# Patient Record
Sex: FEMALE | Race: WHITE | Employment: OTHER
[De-identification: names, ages, dates, MRNs, and addresses within clinical notes are randomized per-mention and may not be internally consistent; named-entity substitution may affect disease eponyms.]

---

## 2017-02-15 ENCOUNTER — OFFICE VISIT (OUTPATIENT)
Dept: OBGYN | Facility: CLINIC | Age: 82
End: 2017-02-15

## 2017-02-15 VITALS
HEART RATE: 83 BPM | BODY MASS INDEX: 20.72 KG/M2 | DIASTOLIC BLOOD PRESSURE: 59 MMHG | HEIGHT: 67 IN | WEIGHT: 132 LBS | SYSTOLIC BLOOD PRESSURE: 95 MMHG

## 2017-02-15 DIAGNOSIS — Z46.89 PESSARY MAINTENANCE: Primary | ICD-10-CM

## 2017-02-15 PROCEDURE — 99213 OFFICE O/P EST LOW 20 MIN: CPT | Performed by: OBSTETRICS & GYNECOLOGY

## 2017-02-15 ASSESSMENT — PATIENT HEALTH QUESTIONNAIRE - PHQ9
SUM OF ALL RESPONSES TO PHQ QUESTIONS 1-9: 0
2. FEELING DOWN, DEPRESSED OR HOPELESS: 0
SUM OF ALL RESPONSES TO PHQ9 QUESTIONS 1 & 2: 0
1. LITTLE INTEREST OR PLEASURE IN DOING THINGS: 0

## 2017-05-17 ENCOUNTER — OFFICE VISIT (OUTPATIENT)
Dept: OBGYN CLINIC | Age: 82
End: 2017-05-17
Payer: MEDICARE

## 2017-05-17 VITALS — WEIGHT: 135 LBS | BODY MASS INDEX: 21.46 KG/M2 | DIASTOLIC BLOOD PRESSURE: 62 MMHG | SYSTOLIC BLOOD PRESSURE: 112 MMHG

## 2017-05-17 DIAGNOSIS — Z46.89 ENCOUNTER FOR PESSARY MAINTENANCE: Primary | ICD-10-CM

## 2017-05-17 PROCEDURE — 99213 OFFICE O/P EST LOW 20 MIN: CPT | Performed by: OBSTETRICS & GYNECOLOGY

## 2017-08-30 ENCOUNTER — OFFICE VISIT (OUTPATIENT)
Dept: OBGYN CLINIC | Age: 82
End: 2017-08-30
Payer: MEDICARE

## 2017-08-30 VITALS
SYSTOLIC BLOOD PRESSURE: 127 MMHG | DIASTOLIC BLOOD PRESSURE: 59 MMHG | BODY MASS INDEX: 21.19 KG/M2 | HEART RATE: 94 BPM | WEIGHT: 135 LBS | HEIGHT: 67 IN

## 2017-08-30 DIAGNOSIS — Z46.89 PESSARY MAINTENANCE: Primary | ICD-10-CM

## 2017-08-30 PROCEDURE — 99213 OFFICE O/P EST LOW 20 MIN: CPT | Performed by: OBSTETRICS & GYNECOLOGY

## 2017-08-30 ASSESSMENT — ENCOUNTER SYMPTOMS
ABDOMINAL PAIN: 0
BLOOD IN STOOL: 0

## 2017-12-01 ENCOUNTER — OFFICE VISIT (OUTPATIENT)
Dept: OBGYN CLINIC | Age: 82
End: 2017-12-01
Payer: MEDICARE

## 2017-12-01 VITALS
HEIGHT: 67 IN | WEIGHT: 137 LBS | HEART RATE: 87 BPM | DIASTOLIC BLOOD PRESSURE: 65 MMHG | BODY MASS INDEX: 21.5 KG/M2 | SYSTOLIC BLOOD PRESSURE: 132 MMHG

## 2017-12-01 DIAGNOSIS — Z46.89 ENCOUNTER FOR PESSARY MAINTENANCE: Primary | ICD-10-CM

## 2017-12-01 PROCEDURE — 99213 OFFICE O/P EST LOW 20 MIN: CPT | Performed by: OBSTETRICS & GYNECOLOGY

## 2017-12-01 ASSESSMENT — ENCOUNTER SYMPTOMS
SHORTNESS OF BREATH: 0
ABDOMINAL PAIN: 0
COUGH: 0

## 2017-12-01 NOTE — PROGRESS NOTES
[facility] PHYSICIANS  [facility] - [address] [place] [id number]  Dept: [phone]    Jeremy Benítez is a 79 yo female who presents for pessary maintenance. She was last here 3 months ago. She says she is doing well and not having any problems. She denies any vaginal bleeding or abnormal vaginal discharge. She says she still loves her pessary, and doesn't have any urinary incontinence with it in place. Review of Systems   Constitutional: Negative for chills and fever. Respiratory: Negative for cough and shortness of breath. Cardiovascular: Negative for chest pain and palpitations. Gastrointestinal: Negative for abdominal pain. Genitourinary: Negative for dysuria. Neurological: Negative for dizziness and headaches. O:not currently breastfeeding. Gen: alert, no apparent distress  Pelvic:  Vulva shows some atrophy, but otherwise normal appearing. Vagina and vaginal cuff without any defects on palpation. Pessary (ring with incontinence knob) removed easily and cleansed with water and soap. Pessary replaced without difficulty. A/P:  1.  Encounter for pessary maintenance     Speculum exam with next visit  Pt to follow up in 3 months for pessary maintenance    Tony Medina MD  [address]

## 2018-03-02 ENCOUNTER — OFFICE VISIT (OUTPATIENT)
Dept: OBGYN CLINIC | Age: 83
End: 2018-03-02
Payer: MEDICARE

## 2018-03-02 VITALS
BODY MASS INDEX: 21.97 KG/M2 | DIASTOLIC BLOOD PRESSURE: 74 MMHG | WEIGHT: 140 LBS | HEIGHT: 67 IN | SYSTOLIC BLOOD PRESSURE: 144 MMHG | HEART RATE: 75 BPM

## 2018-03-02 DIAGNOSIS — Z46.89 PESSARY MAINTENANCE: Primary | ICD-10-CM

## 2018-03-02 PROCEDURE — 99213 OFFICE O/P EST LOW 20 MIN: CPT | Performed by: OBSTETRICS & GYNECOLOGY

## 2018-03-02 ASSESSMENT — ENCOUNTER SYMPTOMS
ABDOMINAL PAIN: 0
SHORTNESS OF BREATH: 0
COUGH: 0

## 2018-06-06 ENCOUNTER — OFFICE VISIT (OUTPATIENT)
Dept: OBGYN CLINIC | Age: 83
End: 2018-06-06
Payer: MEDICARE

## 2018-06-06 VITALS
HEART RATE: 80 BPM | WEIGHT: 138 LBS | HEIGHT: 67 IN | SYSTOLIC BLOOD PRESSURE: 112 MMHG | DIASTOLIC BLOOD PRESSURE: 70 MMHG | BODY MASS INDEX: 21.66 KG/M2

## 2018-06-06 DIAGNOSIS — Z46.89 PESSARY MAINTENANCE: Primary | ICD-10-CM

## 2018-06-06 PROCEDURE — 99213 OFFICE O/P EST LOW 20 MIN: CPT | Performed by: OBSTETRICS & GYNECOLOGY

## 2018-06-06 RX ORDER — CILOSTAZOL 50 MG/1
50 TABLET ORAL 2 TIMES DAILY
COMMUNITY

## 2018-09-12 ENCOUNTER — OFFICE VISIT (OUTPATIENT)
Dept: OBGYN CLINIC | Age: 83
End: 2018-09-12
Payer: MEDICARE

## 2018-09-12 VITALS
SYSTOLIC BLOOD PRESSURE: 141 MMHG | DIASTOLIC BLOOD PRESSURE: 69 MMHG | HEART RATE: 88 BPM | WEIGHT: 138 LBS | HEIGHT: 66 IN | BODY MASS INDEX: 22.18 KG/M2

## 2018-09-12 DIAGNOSIS — Z46.89 PESSARY MAINTENANCE: Primary | ICD-10-CM

## 2018-09-12 PROCEDURE — 99213 OFFICE O/P EST LOW 20 MIN: CPT | Performed by: OBSTETRICS & GYNECOLOGY

## 2018-09-12 ASSESSMENT — ENCOUNTER SYMPTOMS
ABDOMINAL PAIN: 0
SHORTNESS OF BREATH: 0
COUGH: 0

## 2018-09-12 NOTE — PROGRESS NOTES
[facility] PHYSICIANS  [facility] - [address] [place] [id number]  Dept: [phone]    Keith Washburn is a 81 yo female who presents for pessary maintenance. She was last here 3 months ago. She says she is doing well and not having any problems. She denies any vaginal bleeding or abnormal vaginal discharge. There is not having any signs of infection; The vaginal vault is without any signs of erythema or erosion. There is no vaginal discharge or odor appreciated. Review of Systems   Constitutional: Negative for chills and fever. HENT: Negative for hearing loss. Respiratory: Negative for cough and shortness of breath. Cardiovascular: Negative for chest pain and palpitations. Gastrointestinal: Negative for abdominal pain. Genitourinary: Negative for dysuria and hematuria. Neurological: Negative for dizziness and headaches. Psychiatric/Behavioral: Negative for depression. O:Blood pressure (!) 141/69, pulse 88, height 5' 6\" (1.676 m), weight 138 lb (62.6 kg), not currently breastfeeding. Gen: alert, no apparent distress  Pelvic:  There is not any signs of infection; The vaginal vault is not without any signs of erythema or erosion. There is not any vaginal discharge or odor appreciated. Pessary removed easily and cleansed with water and soap. Pessary replaced without difficulty. T.O.S. Ointment was placed with the pessary to decrease discharge/odor. A/P:   Diagnosis Orders   1.  Pessary maintenance       Pt to follow up in 3 months for pessary cleaning    Abbie Peres MD  [address]

## 2018-12-12 ENCOUNTER — OFFICE VISIT (OUTPATIENT)
Dept: OBGYN CLINIC | Age: 83
End: 2018-12-12
Payer: MEDICARE

## 2018-12-12 VITALS
DIASTOLIC BLOOD PRESSURE: 67 MMHG | HEIGHT: 66 IN | WEIGHT: 139 LBS | HEART RATE: 104 BPM | SYSTOLIC BLOOD PRESSURE: 133 MMHG | BODY MASS INDEX: 22.34 KG/M2

## 2018-12-12 DIAGNOSIS — Z46.89 ENCOUNTER FOR PESSARY MAINTENANCE: Primary | ICD-10-CM

## 2018-12-12 PROCEDURE — 99213 OFFICE O/P EST LOW 20 MIN: CPT | Performed by: OBSTETRICS & GYNECOLOGY

## 2018-12-12 ASSESSMENT — ENCOUNTER SYMPTOMS
COUGH: 0
ABDOMINAL PAIN: 0
SHORTNESS OF BREATH: 0
BACK PAIN: 0

## 2018-12-12 NOTE — PROGRESS NOTES
[facility] PHYSICIANS  [facility] - [place] [address] [place] [id number]  Dept: [phone]    Partictracey Norton is a 79 yo female whopresents for pessary maintenance. She was last here 3 months ago. She says she is doing well and not having any problems. She denies any vaginal bleeding or abnormal vaginal discharge. There are not any signs of infection; The vaginal vault is without any signs of erythema or erosion. There is no vaginal discharge or odor appreciated. Review of Systems   Constitutional: Negative for chills and fever. Respiratory: Negative for cough and shortness of breath. Cardiovascular: Negative for chest pain and palpitations. Gastrointestinal: Negative for abdominal pain. Genitourinary: Negative for pelvic pain and vaginal discharge. Musculoskeletal: Negative for back pain. Neurological: Negative for dizziness and light-headedness. O:Blood pressure 133/67, pulse 104, height 5' 6\" (1.676 m), weight 139 lb (63 kg), not currently breastfeeding. Gen: alert, no apparent distress  Pelvic:  There is not any sign of infection. Vuvlar atrophy noted, but unchanged     Pessary removed easily and cleansed with water and soap. Pessary replaced without difficulty. T.O.S. Ointment was placed with the pessary to decrease discharge/odor. A/P:   Diagnosis Orders   1.  Encounter for pessary maintenance     Pt to follow up in 3 months    Barbara Cintron MD  [address]

## 2019-03-13 ENCOUNTER — OFFICE VISIT (OUTPATIENT)
Dept: OBGYN CLINIC | Age: 84
End: 2019-03-13
Payer: MEDICARE

## 2019-03-13 VITALS
HEIGHT: 66 IN | BODY MASS INDEX: 22.02 KG/M2 | DIASTOLIC BLOOD PRESSURE: 69 MMHG | WEIGHT: 137 LBS | SYSTOLIC BLOOD PRESSURE: 133 MMHG | HEART RATE: 96 BPM

## 2019-03-13 DIAGNOSIS — Z46.89 PESSARY MAINTENANCE: Primary | ICD-10-CM

## 2019-03-13 PROCEDURE — 99213 OFFICE O/P EST LOW 20 MIN: CPT | Performed by: OBSTETRICS & GYNECOLOGY

## 2019-03-13 ASSESSMENT — ENCOUNTER SYMPTOMS
SHORTNESS OF BREATH: 0
COUGH: 0
BACK PAIN: 0
ABDOMINAL PAIN: 0

## 2019-06-12 ENCOUNTER — OFFICE VISIT (OUTPATIENT)
Dept: OBGYN CLINIC | Age: 84
End: 2019-06-12
Payer: MEDICARE

## 2019-06-12 VITALS
BODY MASS INDEX: 21.86 KG/M2 | HEIGHT: 66 IN | WEIGHT: 136 LBS | DIASTOLIC BLOOD PRESSURE: 74 MMHG | HEART RATE: 82 BPM | SYSTOLIC BLOOD PRESSURE: 118 MMHG

## 2019-06-12 DIAGNOSIS — Z46.89 PESSARY MAINTENANCE: Primary | ICD-10-CM

## 2019-06-12 PROCEDURE — 99213 OFFICE O/P EST LOW 20 MIN: CPT | Performed by: OBSTETRICS & GYNECOLOGY

## 2019-06-12 ASSESSMENT — ENCOUNTER SYMPTOMS
ABDOMINAL PAIN: 0
BACK PAIN: 0
COUGH: 0
SHORTNESS OF BREATH: 0

## 2019-06-12 NOTE — PROGRESS NOTES
[facility] PHYSICIANS  [facility] - [place] [address] [place] [id number]  Dept: [phone]    Jacques Ash is a 81 yo female who presents for pessary maintenance. She was last here 3 months ago. She says she is doing well and not having any problems. She denies any vaginal bleeding or abnormal vaginal discharge. She is still happy with her ring pessary. Review of Systems   Constitutional: Negative for chills and fever. HENT: Negative for congestion. Respiratory: Negative for cough and shortness of breath. Cardiovascular: Negative for chest pain and palpitations. Gastrointestinal: Negative for abdominal pain. Genitourinary: Negative for vaginal bleeding and vaginal discharge. Musculoskeletal: Negative for back pain. Neurological: Negative for dizziness and light-headedness. Psychiatric/Behavioral: The patient is not nervous/anxious. O:Height 5' 6\" (1.676 m), not currently breastfeeding. Gen: alert, no apparent distress  Pelvic:  There is not any signs of infection; The vaginal vault is without any signs of erythema or erosion. There is no vaginal discharge or odor appreciated. Pessary removed easily and cleansed with water and soap. Pessary replaced without difficulty. T.O.S. Ointment was placed with the pessary to decreasedischarge/odor. A/P:   Diagnosis Orders   1.  Pessary maintenance       Pt to return in 3 months for pessary check    Shannon Wagner MD  [address]

## 2019-09-13 ENCOUNTER — OFFICE VISIT (OUTPATIENT)
Dept: OBGYN CLINIC | Age: 84
End: 2019-09-13
Payer: MEDICARE

## 2019-09-13 VITALS
SYSTOLIC BLOOD PRESSURE: 130 MMHG | WEIGHT: 135 LBS | DIASTOLIC BLOOD PRESSURE: 68 MMHG | BODY MASS INDEX: 21.69 KG/M2 | HEART RATE: 80 BPM | HEIGHT: 66 IN

## 2019-09-13 DIAGNOSIS — Z46.89 PESSARY MAINTENANCE: Primary | ICD-10-CM

## 2019-09-13 PROCEDURE — 99213 OFFICE O/P EST LOW 20 MIN: CPT | Performed by: OBSTETRICS & GYNECOLOGY

## 2019-09-13 ASSESSMENT — ENCOUNTER SYMPTOMS
ABDOMINAL PAIN: 0
BACK PAIN: 0
SHORTNESS OF BREATH: 0
COUGH: 0

## 2020-01-07 ENCOUNTER — OFFICE VISIT (OUTPATIENT)
Dept: OBGYN CLINIC | Age: 85
End: 2020-01-07
Payer: MEDICARE

## 2020-01-07 VITALS
BODY MASS INDEX: 22.34 KG/M2 | HEIGHT: 66 IN | HEART RATE: 80 BPM | SYSTOLIC BLOOD PRESSURE: 139 MMHG | DIASTOLIC BLOOD PRESSURE: 66 MMHG | WEIGHT: 139 LBS

## 2020-01-07 PROCEDURE — 99213 OFFICE O/P EST LOW 20 MIN: CPT | Performed by: OBSTETRICS & GYNECOLOGY

## 2020-01-07 ASSESSMENT — ENCOUNTER SYMPTOMS
ABDOMINAL PAIN: 0
BACK PAIN: 0
COUGH: 0
SHORTNESS OF BREATH: 0

## 2020-01-07 NOTE — PROGRESS NOTES
[address]  Dept: [phone]  1/7/2020    Chief Complaint   Patient presents with    3 Month Follow-Up     Pessary         Silvia Olivera is a 81 yo female who presents for pessary maintenance. She was last here 3 months ago. She says she is doing well and not having any problems. She denies any vaginal bleeding or abnormal vaginal discharge. She is still happy with her pessary. Review of Systems   Constitutional: Negative for chills and fever. HENT: Negative for congestion. Respiratory: Negative for cough and shortness of breath. Cardiovascular: Negative for chest pain and palpitations. Gastrointestinal: Negative for abdominal pain. Genitourinary: Negative for vaginal discharge. Musculoskeletal: Negative for back pain. Neurological: Negative for dizziness and light-headedness. Psychiatric/Behavioral: The patient is not nervous/anxious. O:Blood pressure 139/66, pulse 80, height 5' 6\" (1.676 m), weight 139 lb (63 kg), not currently breastfeeding. Gen: alert, no apparent distress  Pelvic:  There is not any signs of infection; The vaginal vault is without any signs of erythema or erosion. There is no vaginal discharge or odor appreciated. Pessary removed easily and cleansed with water and soap. Pessary replaced without difficulty. T.O.S. Ointment was placed with the pessary to decreasedischarge/odor. A/P:   Diagnosis Orders   1.  Pessary maintenance       Pt to follow up in 3 months    Juany Reynolds MD  [address]

## 2020-05-22 ENCOUNTER — OFFICE VISIT (OUTPATIENT)
Dept: OBGYN CLINIC | Age: 85
End: 2020-05-22
Payer: MEDICARE

## 2020-05-22 VITALS
BODY MASS INDEX: 22.18 KG/M2 | HEART RATE: 82 BPM | WEIGHT: 138 LBS | SYSTOLIC BLOOD PRESSURE: 128 MMHG | HEIGHT: 66 IN | DIASTOLIC BLOOD PRESSURE: 78 MMHG

## 2020-05-22 PROCEDURE — 99213 OFFICE O/P EST LOW 20 MIN: CPT | Performed by: OBSTETRICS & GYNECOLOGY

## 2020-05-22 ASSESSMENT — ENCOUNTER SYMPTOMS
SHORTNESS OF BREATH: 0
BACK PAIN: 0
ABDOMINAL PAIN: 0
COUGH: 0

## 2020-05-22 NOTE — PROGRESS NOTES
[facility] PHYSICIANS  [facility] - [address]  Dept: [phone]    Chief Complaint   Patient presents with    Other     Pasty Pascual is a 79 yo female who presents for pessary maintenance. She was last here 3 months ago. She says she is doing well and not having any problems. She hasn't been going out much due to COVID-19, but is getting by she says. She denies any vaginal bleeding or abnormal vaginal discharge. She is still happy with her pessary. Review of Systems   Constitutional: Negative for chills and fever. HENT: Negative for congestion. Respiratory: Negative for cough and shortness of breath. Cardiovascular: Negative for chest pain and palpitations. Gastrointestinal: Negative for abdominal pain. Genitourinary: Negative for pelvic pain and vaginal discharge. Musculoskeletal: Negative for back pain. Neurological: Negative for dizziness and light-headedness. Psychiatric/Behavioral: The patient is not nervous/anxious. O:Blood pressure 128/78, pulse 82, height 5' 6\" (1.676 m), weight 138 lb (62.6 kg), not currently breastfeeding. Gen: alert, no apparent distress  Pelvic:  There is not any signs of infection; The vaginal vault is without any signs of erythema or erosion. There is no vaginal discharge or odor appreciated. Pessary removed easily and cleansed with water and soap. Pessary replaced without difficulty. T.O.S. Ointment was placed with the pessary to decreasedischarge/odor. A/P:   Diagnosis Orders   1.  Encounter for pessary maintenance         Pt to follow up in 3 months for pessary maintenance  Ramona Srinivasan MD  [address]

## 2020-08-21 ENCOUNTER — OFFICE VISIT (OUTPATIENT)
Dept: OBGYN CLINIC | Age: 85
End: 2020-08-21
Payer: MEDICARE

## 2020-08-21 VITALS — DIASTOLIC BLOOD PRESSURE: 65 MMHG | HEART RATE: 84 BPM | SYSTOLIC BLOOD PRESSURE: 127 MMHG

## 2020-08-21 PROCEDURE — 99213 OFFICE O/P EST LOW 20 MIN: CPT | Performed by: OBSTETRICS & GYNECOLOGY

## 2020-08-21 ASSESSMENT — ENCOUNTER SYMPTOMS
COUGH: 0
SHORTNESS OF BREATH: 0
BACK PAIN: 0
ABDOMINAL PAIN: 0

## 2020-08-21 NOTE — PROGRESS NOTES
[facility] PHYSICIANS  [facility] - [address]  Dept: [phone]    Chief Complaint   Patient presents with    Follow-up     3 mo follow up pessricarda Campos is a 81 yo female who presents for pessary maintenance. She was last here 3 months ago. She says she is doing well and not having any problems. She denies any vaginal bleeding or abnormal vaginal discharge. She is still happy with her pessary. Review of Systems   Constitutional: Negative for chills and fever. Respiratory: Negative for cough and shortness of breath. Cardiovascular: Negative for chest pain and palpitations. Gastrointestinal: Negative for abdominal pain. Musculoskeletal: Negative for back pain. Neurological: Negative for dizziness and light-headedness. Psychiatric/Behavioral: The patient is not nervous/anxious. O:Blood pressure 127/65, pulse 84, not currently breastfeeding. Gen: alert, no apparent distress  Pelvic:  There is not any signs of infection; The vaginal vault is without any signs of erythema or erosion. There is no vaginal discharge or odor appreciated. Pessary removed easily and cleansed with water and soap. Pessary replaced without difficulty. T.O.S. Ointment was placed with the pessary to decreasedischarge/odor. A/P:   Diagnosis Orders   1.  Pessary maintenance         Pt to follow up in 3 months  Elodia Gonzales MD  [address]

## 2020-11-23 ENCOUNTER — OFFICE VISIT (OUTPATIENT)
Dept: OBGYN CLINIC | Age: 85
End: 2020-11-23
Payer: MEDICARE

## 2020-11-23 VITALS
HEART RATE: 81 BPM | BODY MASS INDEX: 22.19 KG/M2 | HEIGHT: 67 IN | WEIGHT: 141.38 LBS | SYSTOLIC BLOOD PRESSURE: 115 MMHG | DIASTOLIC BLOOD PRESSURE: 70 MMHG

## 2020-11-23 PROCEDURE — 99213 OFFICE O/P EST LOW 20 MIN: CPT | Performed by: OBSTETRICS & GYNECOLOGY

## 2020-11-23 RX ORDER — METOPROLOL SUCCINATE 25 MG/1
TABLET, EXTENDED RELEASE ORAL
COMMUNITY
Start: 2020-10-20

## 2020-11-23 ASSESSMENT — ENCOUNTER SYMPTOMS
BACK PAIN: 1
COUGH: 0
SHORTNESS OF BREATH: 0
ABDOMINAL PAIN: 0

## 2020-11-23 NOTE — PROGRESS NOTES
[facility]YSIC[facility]ANS  [facility] - [address]  Dept: [phone]    Chief Complaint   Patient presents with    Follow-up     Pessary maintenance        Mayra Carrier is a 79 yo female who presents for pessary maintenance. She was last here 3 months ago. She says she is doing well, but is having some cardiac issues making it difficult for her to walk as far as she normally does. She denies any vaginal bleeding or abnormal vaginal discharge. She is still happy with her pessary. Review of Systems   Constitutional: Negative for chills and fever. HENT: Negative for congestion. Respiratory: Negative for cough and shortness of breath. Cardiovascular: Positive for leg swelling. Negative for chest pain and palpitations. Gastrointestinal: Negative for abdominal pain. Genitourinary: Negative for pelvic pain and vaginal discharge. Musculoskeletal: Positive for back pain. Neurological: Negative for dizziness and light-headedness. Psychiatric/Behavioral: The patient is not nervous/anxious. O:Blood pressure 115/70, pulse 81, height 5' 6.5\" (1.689 m), weight 141 lb 6 oz (64.1 kg), not currently breastfeeding. Gen: alert, no apparent distress  Pelvic:  There is not any signs of infection; The vaginal vault is without any signs of erythema or erosion. There is no vaginal discharge or odor appreciated. Pessary removed easily and cleansed with water and soap. Pessary replaced without difficulty. T.O.S. Ointment was placed with the pessary to decreasedischarge/odor. A/P:   Diagnosis Orders   1. Pessary maintenance       Pt to follow up in 6 months, will space out to avoid flu season and increased risk for COVID.    Jessy Mercer MD  [address]

## 2021-06-08 ENCOUNTER — OFFICE VISIT (OUTPATIENT)
Dept: OBGYN CLINIC | Age: 86
End: 2021-06-08
Payer: MEDICARE

## 2021-06-08 VITALS
HEART RATE: 72 BPM | DIASTOLIC BLOOD PRESSURE: 55 MMHG | SYSTOLIC BLOOD PRESSURE: 102 MMHG | WEIGHT: 123 LBS | BODY MASS INDEX: 19.56 KG/M2

## 2021-06-08 DIAGNOSIS — Z46.89 ENCOUNTER FOR PESSARY MAINTENANCE: Primary | ICD-10-CM

## 2021-06-08 PROCEDURE — 99213 OFFICE O/P EST LOW 20 MIN: CPT | Performed by: OBSTETRICS & GYNECOLOGY

## 2021-06-08 RX ORDER — POTASSIUM CHLORIDE 20 MEQ/1
20 TABLET, EXTENDED RELEASE ORAL DAILY
COMMUNITY
Start: 2021-04-27

## 2021-06-08 RX ORDER — FUROSEMIDE 40 MG/1
40 TABLET ORAL DAILY
COMMUNITY
Start: 2021-04-27

## 2021-06-08 ASSESSMENT — ENCOUNTER SYMPTOMS
SHORTNESS OF BREATH: 0
BACK PAIN: 0
COUGH: 0
ABDOMINAL PAIN: 0

## 2021-06-08 NOTE — PROGRESS NOTES
[facility] PHYSICIANS  [facility] - [address] [facility] [address] [facility]  Dept: [phone]    Chief Complaint   Patient presents with    Follow-up     Pessary maintenance        Sergey Stanton is a 79 yo female who presents for pessary maintenance. She was last here 3 months ago. She says she is doing well and not having any problems. She denies any vaginal bleeding or abnormal vaginal discharge. She is still happy with her pessary. Review of Systems   Constitutional: Negative for chills and fever. HENT: Negative for congestion. Respiratory: Negative for cough and shortness of breath. Cardiovascular: Negative for chest pain and palpitations. Gastrointestinal: Negative for abdominal pain. Genitourinary: Negative for pelvic pain and vaginal discharge. Musculoskeletal: Negative for back pain. Neurological: Negative for dizziness and light-headedness. Psychiatric/Behavioral: The patient is not nervous/anxious. O:Blood pressure (!) 102/55, pulse 72, weight 123 lb (55.8 kg), not currently breastfeeding. Gen: alert, no apparent distress  Pelvic:  There is not any signs of infection; The vaginal vault is without any signs of erythema or erosion. There is no vaginal discharge or odor appreciated. Pessary removed easily and cleansed with water and soap. Pessary replaced without difficulty. T.O.S. Ointment was placed with the pessary to decreasedischarge/odor. A/P:   Diagnosis Orders   1.  Encounter for pessary maintenance       Pt to follow up in 6 weeks  Evelin Elliott MD  [address]

## 2021-12-08 ENCOUNTER — OFFICE VISIT (OUTPATIENT)
Dept: OBGYN CLINIC | Age: 86
End: 2021-12-08
Payer: MEDICARE

## 2021-12-08 VITALS
BODY MASS INDEX: 19.89 KG/M2 | SYSTOLIC BLOOD PRESSURE: 108 MMHG | HEART RATE: 83 BPM | DIASTOLIC BLOOD PRESSURE: 68 MMHG | WEIGHT: 125.13 LBS

## 2021-12-08 DIAGNOSIS — Z96.0 PRESENCE OF PESSARY: ICD-10-CM

## 2021-12-08 DIAGNOSIS — Z46.89 ENCOUNTER FOR PESSARY MAINTENANCE: Primary | ICD-10-CM

## 2021-12-08 PROCEDURE — 99213 OFFICE O/P EST LOW 20 MIN: CPT | Performed by: OBSTETRICS & GYNECOLOGY

## 2021-12-08 RX ORDER — SACUBITRIL AND VALSARTAN 24; 26 MG/1; MG/1
TABLET, FILM COATED ORAL
COMMUNITY
Start: 2021-11-20

## 2021-12-08 ASSESSMENT — ENCOUNTER SYMPTOMS
BACK PAIN: 0
COUGH: 0
SHORTNESS OF BREATH: 0
ABDOMINAL PAIN: 0

## 2021-12-08 NOTE — PROGRESS NOTES
[facility] PHYSICIANS  [facility] - [address] [facility] [address] [facility]  Dept: [phone]    Chief Complaint   Patient presents with   [address] [facility] Maintenance       Dona Ford is a 81 yo female who presents for pessary maintenance. She was last here 3 months ago. She says she is doing well and not having any problems. She denies any vaginal bleeding or abnormal vaginal discharge. She is still happy with her pessary. Review of Systems   Constitutional: Negative for chills and fatigue. HENT: Negative for congestion. Respiratory: Negative for cough and shortness of breath. Cardiovascular: Negative for chest pain and palpitations. Gastrointestinal: Negative for abdominal pain. Genitourinary: Negative for pelvic pain and vaginal discharge. Musculoskeletal: Negative for back pain. Neurological: Negative for dizziness and light-headedness. Psychiatric/Behavioral: The patient is not nervous/anxious. O:Blood pressure 108/68, pulse 83, weight 125 lb 2 oz (56.8 kg), not currently breastfeeding. Gen: alert, no apparent distress  Pelvic:  There is not any signs of infection; The vaginal vault is without any signs of erythema or erosion. There is no vaginal discharge or odor appreciated. Pessary removed easily and cleansed with water and soap. Pessary replaced without difficulty. T.O.S. Ointment was placed with the pessary to decreasedischarge/odor. A/P:   Diagnosis Orders   1. Encounter for pessary maintenance     2.  Presence of pessary       Pt to follow up in 6 months  Fani Galaviz MD  [address]

## 2022-06-08 ENCOUNTER — OFFICE VISIT (OUTPATIENT)
Dept: OBGYN CLINIC | Age: 87
End: 2022-06-08
Payer: MEDICARE

## 2022-06-08 VITALS
HEIGHT: 66 IN | WEIGHT: 122 LBS | SYSTOLIC BLOOD PRESSURE: 110 MMHG | DIASTOLIC BLOOD PRESSURE: 61 MMHG | BODY MASS INDEX: 19.61 KG/M2 | HEART RATE: 103 BPM

## 2022-06-08 DIAGNOSIS — N99.3 VAGINAL VAULT PROLAPSE AFTER HYSTERECTOMY: ICD-10-CM

## 2022-06-08 DIAGNOSIS — Z46.89 PESSARY MAINTENANCE: Primary | ICD-10-CM

## 2022-06-08 PROCEDURE — 1123F ACP DISCUSS/DSCN MKR DOCD: CPT | Performed by: OBSTETRICS & GYNECOLOGY

## 2022-06-08 PROCEDURE — 99213 OFFICE O/P EST LOW 20 MIN: CPT | Performed by: OBSTETRICS & GYNECOLOGY

## 2022-06-08 ASSESSMENT — ENCOUNTER SYMPTOMS
BACK PAIN: 0
ABDOMINAL PAIN: 0
SHORTNESS OF BREATH: 1

## 2022-06-08 NOTE — PROGRESS NOTES
[address] [facility]  [facility] - [address] \[facility]\"" [address]  Dept: [phone]    Chief Complaint   Patient presents with   [address] [facility] Maintenance       Mary Gates is a 81 yo female who presents for pessary maintenance. She was last here 3 months ago. She says she is doing well and not having any problems. She denies any vaginal bleeding or abnormal vaginal discharge. She is still happy with her pessary. Review of Systems   Constitutional: Positive for fatigue. Negative for chills. HENT: Negative for congestion. Respiratory: Positive for shortness of breath. Cardiovascular: Positive for palpitations. Pedal edema   Gastrointestinal: Negative for abdominal pain. Genitourinary: Negative for pelvic pain and vaginal discharge. Musculoskeletal: Negative for back pain. Neurological: Negative for dizziness and light-headedness. Psychiatric/Behavioral: The patient is not nervous/anxious. O:Blood pressure 110/61, pulse (!) 103, height 5' 6\" (1.676 m), weight 122 lb (55.3 kg), not currently breastfeeding. Gen: alert, no apparent distress  Pelvic:  There is not any signs of infection; The vaginal vault is without any signs of erythema or erosion. There is not vaginal discharge or odor appreciated. Pessary removed easily and cleansed with water and soap. Pessary replaced without difficulty. T.O.S. Ointment was placed with the pessary to decrease discharge/odor. A/P:   Diagnosis Orders   1. Pessary maintenance     2.  Vaginal vault prolapse after hysterectomy       Pt to follow up in 1 year as requested  Olga Lidia Kim MD  [address]